# Patient Record
Sex: FEMALE | Race: BLACK OR AFRICAN AMERICAN | Employment: UNEMPLOYED | URBAN - METROPOLITAN AREA
[De-identification: names, ages, dates, MRNs, and addresses within clinical notes are randomized per-mention and may not be internally consistent; named-entity substitution may affect disease eponyms.]

---

## 2024-11-22 ENCOUNTER — OFFICE VISIT (OUTPATIENT)
Age: 24
End: 2024-11-22

## 2024-11-22 VITALS
DIASTOLIC BLOOD PRESSURE: 81 MMHG | HEIGHT: 60 IN | OXYGEN SATURATION: 97 % | BODY MASS INDEX: 47.59 KG/M2 | SYSTOLIC BLOOD PRESSURE: 122 MMHG | WEIGHT: 242.4 LBS | TEMPERATURE: 98.2 F | HEART RATE: 101 BPM

## 2024-11-22 DIAGNOSIS — J30.1 SEASONAL ALLERGIC RHINITIS DUE TO POLLEN: Primary | ICD-10-CM

## 2024-11-22 RX ORDER — CETIRIZINE HYDROCHLORIDE 10 MG/1
10 TABLET ORAL DAILY
Qty: 20 TABLET | Refills: 0 | Status: SHIPPED | OUTPATIENT
Start: 2024-11-22 | End: 2024-12-12

## 2024-11-22 RX ORDER — FLUTICASONE PROPIONATE 50 MCG
2 SPRAY, SUSPENSION (ML) NASAL DAILY
Qty: 16 G | Refills: 0 | Status: SHIPPED | OUTPATIENT
Start: 2024-11-22

## 2024-11-22 ASSESSMENT — ENCOUNTER SYMPTOMS: COUGH: 1

## 2024-11-22 NOTE — PROGRESS NOTES
[facility]    Mitzi Aguirre (:  2000 MRN: [id number]) is a 24 y.o. female, here for evaluation of the following chief complaint(s):  Cough and Nasal Congestion (Pt c/o coughing, congestion and sneezing for about 2 weeks.)    ASSESSMENT/PLAN:    ICD-10-CM    1. Seasonal allergic rhinitis due to pollen  J30.1           New Prescriptions    CETIRIZINE (ZYRTEC) 10 MG TABLET    Take 1 tablet by mouth daily for 20 days    FLUTICASONE (FLONASE) 50 MCG/ACT NASAL SPRAY    2 sprays by Each Nostril route daily     Summary  - The patient's exam findings and complaint suggest that the disease process is allergy in nature as opposed to a bacterial etiology. Therefore an antibiotic is not necessary at this time. The patient understands that if symptoms get worse or fail to resolve 7 days out from the first day of symptoms, that they should return and be reevaluated and antibiotic therapy will be reconsidered.   - Follow up discussed and will be on an as-needed basis.  Differentials include: Bronchitis, GERD, COVID-19, Influenza, Pneumonia, RSV, Viral Upper Respiratory Infection,    SUBJECTIVE/OBJECTIVE:    Cough      Onset for this issue was 2 week(s) ago. Since the onset, symptoms have been gradually worsening. Relevant symptoms include congestion, cough, post nasal drip, and runny nose. Negative symptoms include chills, fever, headache, vomiting, and wheezing.  Treatments tried include benadryl. Denies recent exposure to anyone with similar symptoms.       Vitals:    24 1500 24 1508   BP: 131/83 122/81   Pulse: (!) 101    Temp: 98.2 °F (36.8 °C)    TempSrc: Oral    SpO2: 97%    Weight: 110 kg (242 lb 6.4 oz)    Height: 1.524 m (5')      No results found for this visit on 24.     No orders to display     PHYSICAL EXAM  Physical Exam  Vitals reviewed.   Constitutional:       General: She is not in acute distress.     Appearance: Normal appearance. She is normal weight. She is not ill-appearing or

## 2025-07-03 ENCOUNTER — HOSPITAL ENCOUNTER (EMERGENCY)
Age: 25
Discharge: HOME | End: 2025-07-03
Attending: STUDENT IN AN ORGANIZED HEALTH CARE EDUCATION/TRAINING PROGRAM

## 2025-07-03 VITALS
HEIGHT: 60 IN | HEART RATE: 80 BPM | WEIGHT: 140 LBS | OXYGEN SATURATION: 99 % | SYSTOLIC BLOOD PRESSURE: 109 MMHG | TEMPERATURE: 98.1 F | BODY MASS INDEX: 27.48 KG/M2 | DIASTOLIC BLOOD PRESSURE: 73 MMHG | RESPIRATION RATE: 18 BRPM

## 2025-07-03 DIAGNOSIS — N61.0 MASTITIS: Primary | ICD-10-CM

## 2025-07-03 LAB
EKG ATRIAL RATE: 55 BPM
EKG DIAGNOSIS: NORMAL
EKG P AXIS: 30 DEGREES
EKG P-R INTERVAL: 134 MS
EKG Q-T INTERVAL: 440 MS
EKG QRS DURATION: 70 MS
EKG QTC CALCULATION (BAZETT): 420 MS
EKG R AXIS: 62 DEGREES
EKG T AXIS: 57 DEGREES
EKG VENTRICULAR RATE: 55 BPM

## 2025-07-03 PROCEDURE — 93010 ELECTROCARDIOGRAM REPORT: CPT | Performed by: INTERNAL MEDICINE

## 2025-07-03 PROCEDURE — 6370000000 HC RX 637 (ALT 250 FOR IP): Performed by: PHYSICIAN ASSISTANT

## 2025-07-03 PROCEDURE — 99283 EMERGENCY DEPT VISIT LOW MDM: CPT

## 2025-07-03 PROCEDURE — 93005 ELECTROCARDIOGRAM TRACING: CPT | Performed by: PHYSICIAN ASSISTANT

## 2025-07-03 RX ORDER — CEPHALEXIN 500 MG/1
500 CAPSULE ORAL 4 TIMES DAILY
Qty: 28 CAPSULE | Refills: 0 | Status: SHIPPED | OUTPATIENT
Start: 2025-07-03 | End: 2025-07-10

## 2025-07-03 RX ADMIN — CEPHALEXIN 500 MG: 250 CAPSULE ORAL at 00:55

## 2025-07-03 ASSESSMENT — PAIN - FUNCTIONAL ASSESSMENT: PAIN_FUNCTIONAL_ASSESSMENT: 0-10

## 2025-07-03 ASSESSMENT — ENCOUNTER SYMPTOMS
NAUSEA: 0
COUGH: 0
ABDOMINAL PAIN: 0
RHINORRHEA: 0
VOMITING: 0
DIARRHEA: 0
SHORTNESS OF BREATH: 0

## 2025-07-03 ASSESSMENT — LIFESTYLE VARIABLES
HOW OFTEN DO YOU HAVE A DRINK CONTAINING ALCOHOL: NEVER
HOW MANY STANDARD DRINKS CONTAINING ALCOHOL DO YOU HAVE ON A TYPICAL DAY: PATIENT DOES NOT DRINK

## 2025-07-03 ASSESSMENT — PAIN SCALES - GENERAL: PAINLEVEL_OUTOF10: 7

## 2025-07-03 ASSESSMENT — PAIN DESCRIPTION - DESCRIPTORS: DESCRIPTORS: ACHING

## 2025-07-03 ASSESSMENT — PAIN DESCRIPTION - LOCATION: LOCATION: CHEST

## 2025-07-03 ASSESSMENT — PAIN DESCRIPTION - ORIENTATION: ORIENTATION: LEFT

## 2025-07-03 NOTE — ED PROVIDER NOTES
EMERGENCY DEPARTMENT PROVIDER NOTE         PATIENT IDENTIFICATION     Name:   Mitzi Aguirre  MRN:   [id number]  YOB: 2000  Date of Evaluation:   7/3/2025  Provider:   KENDALL Green; Mohan Ward DO  [facility]        CHIEF COMPLAINT     Chest Pain (Pt w c/o sob and left sided cp that started 2 days ago. 1 wk post paratum  )        HISTORY OF PRESENT ILLNESS     I independently interviewed patient and/or caretaker(s).  See Advanced Practice Provider (JOSE) note for full HPI.  In summary, Mitzi Aguirre  is a(n) 24 y.o. female who presents with left breast pain that started about 2 days or so ago.  States that she is 9 days postpartum and has been using a breast pump.        PHYSICAL EXAM     I reviewed physical exam performed and documented by JOSE.  I performed an independent physical examination with findings as follows:  Examination performed after verbal consent provided by patient.  Chaperone, nurse Genesis, present throughout duration of examination.  Is an area of warmth and palpable duct over the right breast at about the 11 o'clock position.        EKG INTERPRETATION     TIME:   0031  RATE:   55 bpm  DC INTERVAL:   134 ms  QRS DURATION:   70 ms  QT/QTc:   442/420 ms  RHYTHM:   Sinus bradycardia  AXIS:   Regular  ABNORMALITIES  No STEMI  No ischemic changes    PRIOR EKG:   No EKG on record    INTERPRETATION:  Sinus bradycardia, otherwise normal EKG    REVIEWED BY:   Mohan Ward Jr., DO    EKG was independently reviewed by emergency department physician in absence of cardiologist.        LAB RESULTS     Results for orders placed or performed during the hospital encounter of 07/03/25   EKG 12 Lead   Result Value Ref Range    Ventricular Rate 55 BPM    Atrial Rate 55 BPM    P-R Interval 134 ms    QRS Duration 70 ms    Q-T Interval 440 ms    QTc Calculation (Bazett) 420 ms    P Axis 30 degrees    R Axis 62 degrees    T Axis 57 degrees    Diagnosis Sinus

## 2025-07-03 NOTE — ED PROVIDER NOTES
[facility] EMERGENCY DEPARTMENT  EMERGENCY DEPARTMENT ENCOUNTER        Pt Name: Mitzi Aguirre  MRN: [id number]  Birthdate 2000  Date of evaluation: 7/3/2025  Provider: Steph Noel PA-C  PCP: [facility]  Note Started: 2:11 AM EDT 7/3/25       I have seen and evaluated this patient with my supervising physician No att. providers found.      CHIEF COMPLAINT       Chief Complaint   Patient presents with    Chest Pain     Pt w c/o sob and left sided cp that started 2 days ago. 1 wk post paratum         HISTORY OF PRESENT ILLNESS: 1 or more Elements     History From: Patient  Limitations to history : None    Mitzi Aguirre is a 24 y.o. female who presents to the emergency department today for evaluation for concerns of intermittent left-sided chest pain.  Patient reports that her pain is mostly around her left breast, and has been intermittent for the past 2 days.  She reports that her pain is not associated with exertion.  She denies any short of breath, dizzy, lightheaded or diaphoretic with the pain.  Patient reports that she has had some pain when she palpates her left breast.  She reports that she is attempting to breast-feed as she has 9 days postpartum, however she states that she has been having to use the pump.  She feels that her left breast is larger when compared to the right.  She has no fevers.  No nausea or vomiting.  No significant lower leg swelling for another complaint    Nursing Notes were all reviewed and agreed with or any disagreements were addressed in the HPI.    REVIEW OF SYSTEMS :      Review of Systems   Constitutional:  Negative for activity change, appetite change, chills and fever.   HENT:  Negative for congestion and rhinorrhea.    Respiratory:  Negative for cough and shortness of breath.    Cardiovascular:  Negative for chest pain.   Gastrointestinal:  Negative for abdominal pain, diarrhea, nausea and vomiting.   Genitourinary:  Negative for difficulty urinating,